# Patient Record
Sex: FEMALE
[De-identification: names, ages, dates, MRNs, and addresses within clinical notes are randomized per-mention and may not be internally consistent; named-entity substitution may affect disease eponyms.]

---

## 2019-10-16 ENCOUNTER — HOSPITAL ENCOUNTER (EMERGENCY)
Dept: HOSPITAL 5 - ED | Age: 39
Discharge: HOME | End: 2019-10-16
Payer: COMMERCIAL

## 2019-10-16 VITALS — DIASTOLIC BLOOD PRESSURE: 62 MMHG | SYSTOLIC BLOOD PRESSURE: 106 MMHG

## 2019-10-16 DIAGNOSIS — Z79.899: ICD-10-CM

## 2019-10-16 DIAGNOSIS — N39.0: ICD-10-CM

## 2019-10-16 DIAGNOSIS — Z87.19: ICD-10-CM

## 2019-10-16 DIAGNOSIS — N83.201: Primary | ICD-10-CM

## 2019-10-16 LAB
ALBUMIN SERPL-MCNC: 4.9 G/DL (ref 3.9–5)
ALT SERPL-CCNC: 16 UNITS/L (ref 7–56)
BASOPHILS # (AUTO): 0 K/MM3 (ref 0–0.1)
BASOPHILS NFR BLD AUTO: 0.3 % (ref 0–1.8)
BUN SERPL-MCNC: 18 MG/DL (ref 7–17)
BUN/CREAT SERPL: 30 %
CALCIUM SERPL-MCNC: 9.3 MG/DL (ref 8.4–10.2)
EOSINOPHIL # BLD AUTO: 0.1 K/MM3 (ref 0–0.4)
EOSINOPHIL NFR BLD AUTO: 0.5 % (ref 0–4.3)
HCT VFR BLD CALC: 40.1 % (ref 30.3–42.9)
HEMOLYSIS INDEX: 6
HGB BLD-MCNC: 13.1 GM/DL (ref 10.1–14.3)
LYMPHOCYTES # BLD AUTO: 1.2 K/MM3 (ref 1.2–5.4)
LYMPHOCYTES NFR BLD AUTO: 10.7 % (ref 13.4–35)
MCHC RBC AUTO-ENTMCNC: 33 % (ref 30–34)
MCV RBC AUTO: 90 FL (ref 79–97)
MONOCYTES # (AUTO): 0.5 K/MM3 (ref 0–0.8)
MONOCYTES % (AUTO): 4.2 % (ref 0–7.3)
PH UR STRIP: 6 [PH] (ref 5–7)
PLATELET # BLD: 240 K/MM3 (ref 140–440)
PROT UR STRIP-MCNC: (no result) MG/DL
RBC # BLD AUTO: 4.46 M/MM3 (ref 3.65–5.03)
RBC #/AREA URNS HPF: 13 /HPF (ref 0–6)
UROBILINOGEN UR-MCNC: < 2 MG/DL (ref ?–2)
WBC #/AREA URNS HPF: 28 /HPF (ref 0–6)

## 2019-10-16 PROCEDURE — 96365 THER/PROPH/DIAG IV INF INIT: CPT

## 2019-10-16 PROCEDURE — 96361 HYDRATE IV INFUSION ADD-ON: CPT

## 2019-10-16 PROCEDURE — 87086 URINE CULTURE/COLONY COUNT: CPT

## 2019-10-16 PROCEDURE — 85025 COMPLETE CBC W/AUTO DIFF WBC: CPT

## 2019-10-16 PROCEDURE — 99284 EMERGENCY DEPT VISIT MOD MDM: CPT

## 2019-10-16 PROCEDURE — 81001 URINALYSIS AUTO W/SCOPE: CPT

## 2019-10-16 PROCEDURE — 80053 COMPREHEN METABOLIC PANEL: CPT

## 2019-10-16 PROCEDURE — C9113 INJ PANTOPRAZOLE SODIUM, VIA: HCPCS

## 2019-10-16 PROCEDURE — 96367 TX/PROPH/DG ADDL SEQ IV INF: CPT

## 2019-10-16 PROCEDURE — 83690 ASSAY OF LIPASE: CPT

## 2019-10-16 PROCEDURE — 36415 COLL VENOUS BLD VENIPUNCTURE: CPT

## 2019-10-16 PROCEDURE — 74177 CT ABD & PELVIS W/CONTRAST: CPT

## 2019-10-16 PROCEDURE — 96375 TX/PRO/DX INJ NEW DRUG ADDON: CPT

## 2019-10-16 PROCEDURE — 81025 URINE PREGNANCY TEST: CPT

## 2019-10-16 NOTE — EMERGENCY DEPARTMENT REPORT
<WEST JOHNSON - Last Filed: 10/16/19 18:56>





ED Abdominal Pain HPI





- General


Chief Complaint: Abdominal Pain


Stated Complaint: ABDOMINAL PAIN


Time Seen by Provider: 10/16/19 14:17


Source: patient, EMS


Mode of arrival: Stretcher


Limitations: No Limitations





- History of Present Illness


Initial Comments: 





38 YO IN [place] FOR WORK; FROM [place]. COMES TO ER WITH EPIGASTRIC ABS PAIN. NO 

VOMITING. POS NAUSEA. NO DIARRHEA. NO BLOODY STOOLS. DENIES FEVER OR CHILLS. DID

NOT EAT ANYTHING DIFFERENT. HAD A GRANULA BAR EARLIER TODAY. 





RX


ADDEROL





PMH


A/C ABD PAIN


INTERSUS. 


COLON RESECTION X 2


"TACKING OF INTESTINES TO ABD WALL" PER PT.





DR PITT IN [place] IS HER MD. 





SHE TOOK NO MEDS PTA








MD Complaint: abdominal pain


-: Sudden, hour(s)


Location: epigastric


Migration to: no migration


Quality: stabbing, burning


Consistency: intermittent


Improves With: nothing


Worsens With: nothing


Associated Symptoms: denies other symptoms





- Related Data


                                  Previous Rx's











 Medication  Instructions  Recorded  Last Taken  Type


 


Fluconazole [Diflucan TAB] 100 mg PO QDAY #1 tablet 10/16/19 Unknown Rx


 


Ibuprofen [Motrin] 800 mg PO Q8HR PRN #30 tablet 10/16/19 Unknown Rx


 


Silver Sulfadiazine [Ssd] 400 gm TP BID #1 each 10/16/19 Unknown Rx











                                    Allergies











Allergy/AdvReac Type Severity Reaction Status Date / Time


 


No Known Allergies Allergy   Unverified 10/16/19 13:57














ED Review of Systems


Comment: All other systems reviewed and negative





ED Past Medical Hx





- Past Medical History


Previous Medical History?: Yes


Additional medical history: small bowel obst.  adhd





- Surgical History


Past Surgical History?: Yes


Additional Surgical History: colon resections





- Family History


Family history: no significant





- Social History


Smoking Status: Never Smoker


Substance Use Type: None





- Medications


Home Medications: 


                                Home Medications











 Medication  Instructions  Recorded  Confirmed  Last Taken  Type


 


Fluconazole [Diflucan TAB] 100 mg PO QDAY #1 tablet 10/16/19  Unknown Rx


 


Ibuprofen [Motrin] 800 mg PO Q8HR PRN #30 tablet 10/16/19  Unknown Rx


 


Silver Sulfadiazine [Ssd] 400 gm TP BID #1 each 10/16/19  Unknown Rx














ED Physical Exam





- General


Limitations: No Limitations


General appearance: alert





- Head


Head exam: Present: normocephalic





- Eye


Eye exam: Present: normal appearance, PERRL





- ENT


ENT exam: Present: mucous membranes moist





- Neck


Neck exam: Present: normal inspection





- Respiratory


Respiratory exam: Present: normal lung sounds bilaterally





- Cardiovascular


Cardiovascular Exam: Present: regular rate





- GI/Abdominal


GI/Abdominal exam: Present: soft, tenderness, normal bowel sounds.  Absent: 

distended, guarding, rebound, rigid, diminished bowel sounds, hyperactive bowel 

sounds, hypoactive bowel sounds, bruit, pulsatile mass, hernia





- Rectal


Rectal exam: Present: deferred





- Extremities Exam


Extremities exam: Present: normal inspection, full ROM





- Back Exam


Back exam: Present: normal inspection, full ROM





- Neurological Exam


Neurological exam: Present: alert, oriented X3, CN II-XII intact





- Psychiatric


Psychiatric exam: Present: normal affect, normal mood





- Skin


Skin exam: Present: warm, dry, intact





ED Course





- Reevaluation(s)


Reevaluation #1: 





10/16/19 17:57


CT CALLED


ORDERED CT AT 1425- NOT YET DONE





ED Medical Decision Making





- Lab Data


Result diagrams: 


                                 10/16/19 14:05





                                 10/16/19 14:05





- Radiology Data


Radiology results: report reviewed, image reviewed





- Medical Decision Making








Labs











  10/16/19 10/16/19





  14:05 14:05


 


WBC  11.5 H 


 


RBC  4.46 


 


Hgb  13.1 


 


Hct  40.1 


 


MCV  90 


 


MCH  29 


 


MCHC  33 


 


RDW  13.9 


 


Plt Count  240 


 


Lymph % (Auto)  10.7 L 


 


Mono % (Auto)  4.2 


 


Eos % (Auto)  0.5 


 


Baso % (Auto)  0.3 


 


Lymph #  1.2 


 


Mono #  0.5 


 


Eos #  0.1 


 


Baso #  0.0 


 


Seg Neutrophils %  84.3 H 


 


Seg Neutrophils #  9.7 H 


 


Sodium   139


 


Potassium   4.6


 


Chloride   101.9


 


Carbon Dioxide   21 L


 


Anion Gap   21


 


BUN   18 H


 


Creatinine   0.6 L


 


Estimated GFR   > 60


 


BUN/Creatinine Ratio   30


 


Glucose   101 H


 


Calcium   9.3


 


Total Bilirubin   0.30


 


AST   18


 


ALT   16


 


Alkaline Phosphatase   31 L


 


Total Protein   7.6


 


Albumin   4.9


 


Albumin/Globulin Ratio   1.8











                                   Vital Signs











  10/16/19





  13:58


 


Temperature 97.8 F


 


Pulse Rate 81


 


Respiratory 16





Rate 


 


Blood Pressure 107/75


 


O2 Sat by Pulse 100





Oximetry 








CT NOTED





TREATED WITH ZOSYN FOR CONCERN OF INTRAABD INFECTION





UA THEN NOTED


GIVEN ROCEPHIN FOR GRAM POS





NS/ZOFRAN/PAIN MEDS





DC HOME WITH DC PLAN OF CARE AND FOLLOW UP. LOCAL REFERRALS GIVEN. 





ED Disposition


Clinical Impression: 


Abdominal pain


Qualifiers:


 Abdominal location: right lower quadrant Qualified Code(s): R10.31 - Right 

lower quadrant pain





UTI (urinary tract infection)


Qualifiers:


 Urinary tract infection type: acute cystitis Hematuria presence: without 

hematuria Qualified Code(s): N30.00 - Acute cystitis without hematuria





Ovarian cyst


Qualifiers:


 Laterality: bilateral Qualified Code(s): N83.201 - Unspecified ovarian cyst, 

right side; N83.202 - Unspecified ovarian cyst, left side





Disposition: DC-01 TO HOME OR SELFCARE


Is pt being admited?: No


Does the pt Need Aspirin: No


Condition: Stable


Instructions:  Abdominal Pain (ED)


Additional Instructions: 


HYDRATE WELL WITH WATER





CONTINUE HOME MEDS





FOLLOW UP WITH GI MD WHEN YOU GET HOME


LOCAL REFERRAL GIVEN





MEDS AS ORDERED TODAY








Prescriptions: 


Fluconazole [Diflucan TAB] 100 mg PO QDAY #1 tablet


Ibuprofen [Motrin] 800 mg PO Q8HR PRN #30 tablet


 PRN Reason: Pain, Moderate (4-6)


Silver Sulfadiazine [Ssd] 400 gm TP BID #1 each


Referrals: 


PRIMARY CARE,MD [Primary Care Provider] - 3-5 Days


[facility] [Outside] - 3-5 Days


Time of Disposition: 15:13





<IZABELA ROSSI - Last Filed: 10/16/19 19:46>





ED Review of Systems


ROS: 


Stated complaint: ABDOMINAL PAIN


Other details as noted in HPI








ED Course





                                   Vital Signs











  10/16/19 10/16/19





  13:58 16:35


 


Temperature 97.8 F 97.9 F


 


Pulse Rate 81 81


 


Respiratory 16 18





Rate  


 


Blood Pressure 107/75 


 


Blood Pressure  100/67





[Left]  


 


O2 Sat by Pulse 100 99





Oximetry  














ED Medical Decision Making





- Lab Data


Result diagrams: 


                                 10/16/19 14:05





                                 10/16/19 14:05


Critical care attestation.: 


If time is entered above; I have spent that time in minutes in the direct care 

of this critically ill patient, excluding procedure time.

## 2019-10-16 NOTE — CAT SCAN REPORT
CT ABDOMEN AND PELVIS WITH IV CONTRAST



INDICATION:

ABD PAIN.



COMPARISON:

None available.



TECHNIQUE:

All CT scans at this facility use dose modulation, automated exposure control, iterative reconstructi
on or weight based dosing, when appropriate, to reduce radiation dose to as low as reasonably achieva
ble.



FINDINGS:



Lung Bases: No significant abnormality.



Skeletal System: No acute abnormality.



ABDOMEN:

Liver: No significant abnormality.

Gallbladder: No significant abnormality.  

Bile Ducts: No significant abnormality.

Pancreas: No significant abnormality.

Spleen: No significant abnormality.

Adrenals: No significant abnormality.

Right Kidney: No significant abnormality.

Left Kidney: No significant abnormality.

Upper GI tract: No small bowel or gastric distention.

Lymph Nodes: No significant adenopathy.

Aorta: No significant abnormality. 

Additional Findings: There is approximately 270 degrees of twisting of the mesenteric vessels (as see
n around axial image 48 of series 4).



PELVIS:

Colon:  No acute abnormality.

Urinary Bladder and Distal Ureters: No significant abnormality.

Appendix: Not visualized.  

Lymph Nodes: No significant adenopathy.

Additional Findings: There is a 2.5 cm right ovarian cyst. Prominent left ovarian/periuterine veins a
re noted. IUD is noted in expected position. There is trace free fluid in the cul-de-sac.





IMPRESSION:

1.  No bowel obstruction or acute inflammatory process is seen.

2.  Right ovarian cyst and trace free fluid in the cul-de-sac may be physiologic.

3. There is mild twisting of the mesenteric vessels but no bowel obstruction or pneumatosis is seen. 
This could be related to prior surgeries.

4. There are prominent left ovarian/periuterine veins which could be seen in the setting of pelvic co
ngestion syndrome, correlate clinically.



Signer Name: Duran Chan MD 

Signed: 10/16/2019 7:31 PM

 Workstation Name: [id number]